# Patient Record
Sex: FEMALE
[De-identification: names, ages, dates, MRNs, and addresses within clinical notes are randomized per-mention and may not be internally consistent; named-entity substitution may affect disease eponyms.]

---

## 2024-08-20 ENCOUNTER — DASHBOARD ENCOUNTERS (OUTPATIENT)
Age: 34
End: 2024-08-20

## 2024-08-20 ENCOUNTER — OFFICE VISIT (OUTPATIENT)
Dept: URBAN - METROPOLITAN AREA CLINIC 48 | Facility: CLINIC | Age: 34
End: 2024-08-20
Payer: COMMERCIAL

## 2024-08-20 VITALS
SYSTOLIC BLOOD PRESSURE: 128 MMHG | HEIGHT: 63 IN | WEIGHT: 210.8 LBS | TEMPERATURE: 97.5 F | HEART RATE: 81 BPM | DIASTOLIC BLOOD PRESSURE: 87 MMHG | BODY MASS INDEX: 37.35 KG/M2

## 2024-08-20 DIAGNOSIS — K59.01 SLOW TRANSIT CONSTIPATION: ICD-10-CM

## 2024-08-20 DIAGNOSIS — R10.13 EPIGASTRIC PAIN: ICD-10-CM

## 2024-08-20 PROBLEM — 235595009: Status: ACTIVE | Noted: 2024-08-20

## 2024-08-20 PROBLEM — 35298007: Status: ACTIVE | Noted: 2024-08-20

## 2024-08-20 PROCEDURE — 99204 OFFICE O/P NEW MOD 45 MIN: CPT | Performed by: INTERNAL MEDICINE

## 2024-08-20 RX ORDER — SUCRALFATE 1 G/1
1 TABLET ON AN EMPTY STOMACH TABLET ORAL THREE TIMES A DAY
Qty: 42 TABLET | Refills: 1 | OUTPATIENT
Start: 2024-08-20 | End: 2024-09-17

## 2024-08-20 RX ORDER — OMEPRAZOLE 40 MG/1
1 CAPSULE 30 MINUTES BEFORE MORNING MEAL CAPSULE, DELAYED RELEASE ORAL ONCE A DAY
Qty: 30 | Refills: 3 | OUTPATIENT
Start: 2024-08-20

## 2024-08-20 NOTE — HPI-TODAY'S VISIT:
33 y/o female presents as a new patient with multiple concerns. She has been having worsening constipation over several months and goes up to a week between movements; has relief with drinking a tea PRN. She denies dietary or medication changes, but later admits she has recently implemented intermittent fasting. Stools are sometimes [place] stool scale 1, and she has had some scale 4 stools. She denies melena or hematochezia and has no known family h/o CRC or polyps. She has seen some mucus in the stool. She also c/o intermittent reflux for some time for which she has used Pepcid PRN, but recently has been having worsening dyspepsia with bouts of significant epigastric pain which seem worse after eating. She denies NSAID use, and denies N/V. She states her grandmother had ulcers. She has been taking Pepcid and recently picked up OTC Omeprazole and gets temporary resolution of pain when she takes 40 mg. Weight is stable.

## 2024-09-11 ENCOUNTER — OFFICE VISIT (OUTPATIENT)
Dept: URBAN - METROPOLITAN AREA CLINIC 48 | Facility: CLINIC | Age: 34
End: 2024-09-11

## 2024-10-22 ENCOUNTER — OFFICE VISIT (OUTPATIENT)
Dept: URBAN - METROPOLITAN AREA CLINIC 48 | Facility: CLINIC | Age: 34
End: 2024-10-22

## 2024-10-22 RX ORDER — OMEPRAZOLE 40 MG/1
1 CAPSULE 30 MINUTES BEFORE MORNING MEAL CAPSULE, DELAYED RELEASE ORAL ONCE A DAY
Qty: 30 | Refills: 3 | Status: ACTIVE | COMMUNITY
Start: 2024-08-20